# Patient Record
Sex: FEMALE | Race: WHITE | URBAN - METROPOLITAN AREA
[De-identification: names, ages, dates, MRNs, and addresses within clinical notes are randomized per-mention and may not be internally consistent; named-entity substitution may affect disease eponyms.]

---

## 2018-07-20 ENCOUNTER — OFFICE VISIT (OUTPATIENT)
Dept: FAMILY MEDICINE | Facility: CLINIC | Age: 23
End: 2018-07-20
Payer: COMMERCIAL

## 2018-07-20 VITALS
DIASTOLIC BLOOD PRESSURE: 62 MMHG | BODY MASS INDEX: 21.23 KG/M2 | SYSTOLIC BLOOD PRESSURE: 104 MMHG | TEMPERATURE: 97.4 F | RESPIRATION RATE: 14 BRPM | HEIGHT: 65 IN | WEIGHT: 127.4 LBS | OXYGEN SATURATION: 100 % | HEART RATE: 65 BPM

## 2018-07-20 DIAGNOSIS — R09.81 CONGESTION OF PARANASAL SINUS: Primary | ICD-10-CM

## 2018-07-20 PROCEDURE — 99213 OFFICE O/P EST LOW 20 MIN: CPT | Performed by: FAMILY MEDICINE

## 2018-07-20 RX ORDER — FLUTICASONE PROPIONATE 50 MCG
2 SPRAY, SUSPENSION (ML) NASAL DAILY
Qty: 16 G | Refills: 1 | Status: SHIPPED | OUTPATIENT
Start: 2018-07-20

## 2018-07-20 NOTE — PATIENT INSTRUCTIONS
[facility]    If you have any questions regarding to your visit please contact your care team:       Team Purple:   Clinic Hours Telephone Number   Dr. Elma Marie   7am-7pm  Monday - Thursday   7am-5pm  Fridays  [phone]  (Appointment scheduling available 24/7)    Questions about your recent visit?   Team Line:  [phone]   Urgent Care - [place] and [facility] - 11am-9pm Monday-Friday Saturday-Sunday- 9am-5pm   [place] - 5pm-9pm Monday-Friday Saturday-Sunday- 9am-5pm  [phone] - [place]  [phone] - [place]       What options do I have for a visit other than an office visit? We offer electronic visits (e-visits) and telephone visits, when medically appropriate.  Please check with your medical insurance to see if these types of visits are covered, as you will be responsible for any charges that are not paid by your insurance.      You can use [employer] (secure electronic communication) to access to your chart, send your primary care provider a message, or make an appointment. Ask a team member how to get started.     For a price quote for your services, please call our Consumer Price Line at [phone] or our Imaging Cost estimation line at [phone] (for imaging tests).    Yovani Foreman

## 2018-07-20 NOTE — MR AVS SNAPSHOT
After Visit Summary   7/20/2018    Bassam Swanson    MRN: [id number]           Patient Information     Date Of Birth          1995        Visit Information        Provider Department      7/20/2018 12:20 PM Isra Pulido MD [facility]        Today's Diagnoses     Congestion of paranasal sinus    -  1      Care Instructions    [facility]    If you have any questions regarding to your visit please contact your care team:       Team Purple:   Clinic Hours Telephone Number   Dr. Elam Marie   7am-7pm  Monday - Thursday   7am-5pm  Fridays  [phone]  (Appointment scheduling available 24/7)    Questions about your recent visit?   Team Line:  [phone]   Urgent Care - [place] and [facility] - 11am-9pm Monday-Friday Saturday-Sunday- 9am-5pm   [place] - 5pm-9pm Monday-Friday Saturday-Sunday- 9am-5pm  [phone] - [place]  [phone] - [place]       What options do I have for a visit other than an office visit? We offer electronic visits (e-visits) and telephone visits, when medically appropriate.  Please check with your medical insurance to see if these types of visits are covered, as you will be responsible for any charges that are not paid by your insurance.      You can use [employer] (secure electronic communication) to access to your chart, send your primary care provider a message, or make an appointment. Ask a team member how to get started.     For a price quote for your services, please call our Consumer Price Line at [phone] or our Imaging Cost estimation line at [phone] (for imaging tests).    Yovani Foreman            Follow-ups after your visit        Follow-up notes from your care team     Return in about 2 weeks (around 8/3/2018).      Who to contact     If you have questions or need follow up information about today's clinic visit or your schedule please contact  "[facility] directly at [phone].  Normal or non-critical lab and imaging results will be communicated to you by MyChart, letter or phone within 4 business days after the clinic has received the results. If you do not hear from us within 7 days, please contact the clinic through [employer] or phone. If you have a critical or abnormal lab result, we will notify you by phone as soon as possible.  Submit refill requests through [employer] or call your pharmacy and they will forward the refill request to us. Please allow 3 business days for your refill to be completed.          Additional Information About Your Visit        [employer] Information     [employer] lets you send messages to your doctor, view your test results, renew your prescriptions, schedule appointments and more. To sign up, go to [email or web address] . Click on \"Log in\" on the left side of the screen, which will take you to the Welcome page. Then click on \"Sign up Now\" on the right side of the page.     You will be asked to enter the access code listed below, as well as some personal information. Please follow the directions to create your username and password.     Your access code is: [email or web address]  Expires: 10/18/2018 12:15 PM     Your access code will  in 90 days. If you need help or a new code, please call your [facility] clinic or [phone].        Care EveryWhere ID     This is your Care EveryWhere ID. This could be used by other organizations to access your [facility] medical records  [id number]        Your Vitals Were     Pulse Temperature Respirations Height Last Period Pulse Oximetry    65 97.4  F (36.3  C) (Oral) 14 5' 5\" (1.651 m) 2018 (Exact Date) 100%    Breastfeeding? BMI (Body Mass Index)                No 21.2 kg/m2           Blood Pressure from Last 3 Encounters:   18 104/62    Weight from Last 3 Encounters:   18 127 lb 6.4 oz (57.8 kg)              Today, you had the following     No orders found for " display         Today's Medication Changes          These changes are accurate as of 7/20/18 12:53 PM.  If you have any questions, ask your nurse or doctor.               Start taking these medicines.        Dose/Directions    fluticasone 50 MCG/ACT spray   Commonly known as:  FLONASE   Used for:  Congestion of paranasal sinus   Started by:  Isra Pulido MD        Dose:  2 spray   Spray 2 sprays into both nostrils daily   Quantity:  16 g   Refills:  1            Where to get your medicines      These medications were sent to [facility] [place] - [place], [place] - [address]  [address], [place] [address]     Phone:  [phone]     fluticasone 50 MCG/ACT spray                Primary Care Provider Fax #    Physician No Ref-Primary [phone]       No address on file        Equal Access to Services     SAYRA ZABALA : Brice calros Sosaba, waaxda luqadaha, qaybta kaalmada janell, nemesio fisher . So [facility] [phone].    ATENCIÓN: Si habla español, tiene a epres disposición servicios gratuitos de asistencia lingüística. [facility] [phone].    We comply with applicable federal civil rights laws and [place] laws. We do not discriminate on the basis of race, color, national origin, age, disability, sex, sexual orientation, or gender identity.            Thank you!     Thank you for choosing [facility]  for your care. Our goal is always to provide you with excellent care. Hearing back from our patients is one way we can continue to improve our services. Please take a few minutes to complete the written survey that you may receive in the mail after your visit with us. Thank you!             Your Updated Medication List - Protect others around you: Learn how to safely use, store and throw away your medicines at www.disposemymeds.org.          This list is accurate as of 7/20/18 12:53 PM.  Always use your most recent med  list.                   Brand Name Dispense Instructions for use Diagnosis    fluticasone 50 MCG/ACT spray    FLONASE    16 g    Spray 2 sprays into both nostrils daily    Congestion of paranasal sinus

## 2018-07-20 NOTE — NURSING NOTE
"Chief Complaint   Patient presents with     URI     Initial Pulse 65  Temp 97.4  F (36.3  C) (Oral)  Resp 14  Ht 5' 5\" (1.651 m)  Wt 127 lb 6.4 oz (57.8 kg)  LMP 07/14/2018 (Exact Date)  SpO2 100%  Breastfeeding? No  BMI 21.2 kg/m2 Estimated body mass index is 21.2 kg/(m^2) as calculated from the following:    Height as of this encounter: 5' 5\" (1.651 m).    Weight as of this encounter: 127 lb 6.4 oz (57.8 kg).  BP completed using cuff size: dia Foreman  "

## 2018-07-20 NOTE — PROGRESS NOTES
"  SUBJECTIVE:   Bassam Swanson is a 23 year old female who presents to clinic today for the following health issues:    RESPIRATORY SYMPTOMS    Duration: x 1 month    Description  nasal congestion, sore throat, facial pain/pressure and cough    Severity: moderate    Accompanying signs and symptoms: None    History (predisposing factors):  none    Precipitating or alleviating factors: Augmentin, stopped about 4 days ago    Therapies tried and outcome:  none    Started with sore throat, HA, chills and body aches  Later cough, cold and stayed. Took Augmentin for 10 days  At this times, pressure in the eyes, nasal congestion, minimal drainage with some PND  Smell okay, no teeth pain.     Problem list and histories reviewed & adjusted, as indicated.  Additional history: as documented    There is no problem list on file for this patient.    History reviewed. No pertinent surgical history.    Social History   Substance Use Topics     Smoking status: Never Smoker     Smokeless tobacco: Never Used     Alcohol use No     History reviewed. No pertinent family history.      Reviewed and updated as needed this visit by clinical staff  Tobacco  Allergies  Meds  Problems  Med Hx  Surg Hx  Fam Hx  Soc Hx        ROS:  Constitutional,  cardiovascular, pulmonary, gi and gu systems are negative, except as otherwise noted.    OBJECTIVE:     /62 (BP Location: Left arm, Patient Position: Chair, Cuff Size: Adult Regular)  Pulse 65  Temp 97.4  F (36.3  C) (Oral)  Resp 14  Ht 5' 5\" (1.651 m)  Wt 127 lb 6.4 oz (57.8 kg)  LMP 07/14/2018 (Exact Date)  SpO2 100%  Breastfeeding? No  BMI 21.2 kg/m2  Body mass index is 21.2 kg/(m^2).  GENERAL: healthy, alert and no distress  NECK: no adenopathy and thyroid normal to palpation  ENT: Nasal congestion  RESP: lungs clear to auscultation - no rales, rhonchi or wheezes  CV: regular rate and rhythm, normal S1 S2, no S3 or S4, no murmur, click or rub.  MS: no gross musculoskeletal " defects noted, no edema    Diagnostic Test Results:  none     ASSESSMENT/PLAN:     (R09.81) Congestion of paranasal sinus  (primary encounter diagnosis)  Comment: I discussed the pathophysiology of sinus pressure/sinusitis and treatment options with emphasis on healty lifestyle and opening up natural drainage passages.  I discussed the risks and benefits of various over the counter and prescription treatments including short courses of decongestant nasal sprays.  Recheck if not improving as expected  Plan: fluticasone (FLONASE) 50 MCG/ACT spray       Call or return to clinic prn if these symptoms worsen or fail to improve as anticipated in 2 weeks.       Isra Pulido MD  [facility]\

## 2019-02-26 ENCOUNTER — TELEPHONE (OUTPATIENT)
Dept: FAMILY MEDICINE | Facility: CLINIC | Age: 24
End: 2019-02-26

## 2019-02-26 NOTE — TELEPHONE ENCOUNTER
Panel Management Review      Patient has the following on her problem list: None      Composite cancer screening  Chart review shows that this patient is due/due soon for the following Pap Smear  Summary:    Patient is due/failing the following:   PAP and PHYSICAL    Action needed:   Patient needs office visit for physical.    Type of outreach:    Sent letter.    Questions for provider review:    None                                                                                                                                    Angela Jacob MA       Chart routed to none .

## 2019-02-26 NOTE — LETTER
February 26, 2019          Bassam Swanson,  [address] [address]        Dear Bassam Swanson      Monitoring and managing your preventative and chronic health conditions are very important to us. Our records indicate that you have not scheduled for Annual Female Exam and Pap Smear  which was recommended by Dr. Pulido.      If you have received your health care elsewhere, please call the clinic so the information can be documented in your chart.    Please call [phone] or message us through your [employer] account to schedule an appointment or provide information for your chart.     Feel free to contact us if you have any questions or concerns!    I look forward to seeing you and working with you on your health care needs.     Sincerely,       Your [facility] Team/sg